# Patient Record
(demographics unavailable — no encounter records)

---

## 2017-02-04 NOTE — ED
Ander GAMBLE Matthew, scribed for Cesar Boss MD on 02/04/17 at 1123 .





HPI Chest Pain





- HPI Summary


HPI Summary: 


An 83 y/o male presents to the ED with constant, mid sternal chest pain since 07

:30 this morning. The patient was eating breakfast when he noticed left arm 

numbness. Associated symptoms include diaphoresis - which has since resolved 

and left arm pain, which has since resolved. The patient denies nausea, vomiting

, and SOB. Currently he states that chest pain has partially alleviated, but 

remains noticeable. The patient has a Hx of HTN, HLD, and NO diabetes. FHx: No 

CAD. 324 ASA in the EMS PTA. 








- History of Current Complaint


Chief Complaint: EDChestPainROMI


Hx Obtained From: Patient


Onset/Duration: Started Hours Ago, Atraumatic, Still Present


Time of Onset: 07:30


Timing: Constant


Initial Severity: Moderate


Current Severity: Moderate


Chest Pain Location: Mid Sternal


Chest Pain Radiates: Yes


Chest Pain Radiates To:: Arm - LT


Associated Signs and Symptoms: Positive: Chest Pain, Numbness - LT arm - since 

resolved, Diaphoresis, Other: - Left arm pain - since resolved.  Negative: 

Shortness of Breath, Nausea, Vomiting





- Allergy/Home Medications


Allergies/Adverse Reactions: 


 Allergies











Allergy/AdvReac Type Severity Reaction Status Date / Time


 


No Known Allergies Allergy   Verified 10/14/16 09:10














PMH/Surg Hx/FS Hx/Imm Hx


Endocrine/Hematology History: 


   Denies: Hx Diabetes


Cardiovascular History: Reports: Hx Hypercholesterolemia, Hx Hypertension


   Denies: Hx Pacemaker/ICD


 History: 


   Denies: Hx Renal Disease


Musculoskeletal History: Reports: Hx Arthritis, Hx Back Problems - spinal 

stenosis


Sensory History: Reports: Hx Cataracts


   Denies: Hx Hearing Aid


Opthamlomology History: Reports: Hx Cataracts


Psychiatric History: 


   Denies: Hx Panic Disorder





- Surgical History


Surgery Procedure, Year, and Place: HERNIA SURGERY 1965-CATARACTS BILATERAL 

EYES 2011


Infectious Disease History: No


Infectious Disease History: 


   Denies: Traveled Outside the [place] in Last 30 Days





- Family History


Known Family History: 


   Negative: Cardiac Disease





- Social History


Alcohol Use: Rare


Substance Use Type: Reports: None


Smoking Status (MU): Never Smoked Tobacco





Review of Systems


Positive: Skin Diaphoresis - For a few minutes, which has since resolved 


Eyes: Negative


ENT: Negative


Positive: Chest Pain


Respiratory: Negative


Negative: Shortness Of Breath


Gastrointestinal: Negative


Negative: Vomiting, Nausea


Genitourinary: Negative


Positive: Myalgia - Left Arm Pain - since resolved 


Skin: Negative


Positive: Numbness - Left arm numbness - since resolved 


Psychological: Normal


All Other Systems Reviewed And Are Negative: Yes





Physical Exam


Triage Information Reviewed: Yes


Vital Signs On Initial Exam: 


 Initial Vitals











Temp Pulse Resp BP Pulse Ox


 


 98.2 F   77   18   212/78   98 


 


 02/04/17 10:56  02/04/17 10:56  02/04/17 10:56  02/04/17 10:56  02/04/17 10:56











Vital Signs Reviewed: Yes


Appearance: Positive: No Pain Distress, Well-Nourished


Skin: Positive: Warm, Skin Color Reflects Adequate Perfusion


Head/Face: Positive: Normal Head/Face Inspection


Eyes: Positive: Normal, EOMI


Neck: Positive: Supple, Nontender


Respiratory/Lung Sounds: Positive: Clear to Auscultation, Breath Sounds Present


Cardiovascular: Positive: Normal, RRR.  Negative: Murmur


Abdomen Description: Positive: Nontender


Musculoskeletal: Positive: Normal, Strength/ROM Intact


Neurological: Positive: Normal, Sensory/Motor Intact, Alert, Oriented to Person 

Place, Time, CN Intact II-III


Psychiatric: Positive: Normal





- Shea Coma Scale


Best Eye Response: 4 - Spontaneous


Best Motor Response: 6 - Obeys Commands


Best Verbal Response: 5 - Oriented





Diagnostics





- Vital Signs


 Vital Signs











  Temp Pulse Resp BP Pulse Ox


 


 02/04/17 10:56  98.2 F  77  18  212/78  98














- Laboratory


Lab Results: 


 Lab Results











  02/04/17 Range/Units





  11:05 


 


WBC  11.6 H  (3.5-10.8)  10^3/ul


 


RBC  3.93 L  (4.0-5.4)  10^6/ul


 


Hgb  13.2 L  (14.0-18.0)  g/dl


 


Hct  40 L  (42-52)  %


 


MCV  101 H  (80-94)  fL


 


MCH  34 H  (27-31)  pg


 


MCHC  33  (31-36)  g/dl


 


RDW  13  (10.5-15)  %


 


Plt Count  220  (150-450)  10^3/ul


 


MPV  10  (7.4-10.4)  um3


 


Neut % (Auto)  76.9  (38-83)  %


 


Lymph % (Auto)  13.4 L  (25-47)  %


 


Mono % (Auto)  8.6  (1-9)  %


 


Eos % (Auto)  0.8  (0-6)  %


 


Baso % (Auto)  0.3  (0-2)  %


 


Absolute Neuts (auto)  8.9 H  (1.5-7.7)  10^3/ul


 


Absolute Lymphs (auto)  1.5  (1.0-4.8)  10^3/ul


 


Absolute Monos (auto)  1.0 H  (0-0.8)  10^3/ul


 


Absolute Eos (auto)  0.1  (0-0.6)  10^3/ul


 


Absolute Basos (auto)  0  (0-0.2)  10^3/ul


 


Absolute Nucleated RBC  0  10^3/ul


 


Nucleated RBC %  0  











Result Diagrams: 


 02/04/17 11:05





 02/04/17 11:05


Lab Statement: Any lab studies that have been ordered have been reviewed, and 

results considered in the medical decision making process.





- EKG


  ** 10:51


Cardiac Rate: NL - 76 bpm


EKG Rhythm: Sinus Rhythm


EKG Interpretation: Inferior Infarct 





Chest Pain Course/Dx





- Course


Course Of Treatment: 84 yr old male with MI, to cath lab with Dr Corcoran who has 

seen the patient.





- Diagnoses


Provider Diagnoses: 


 Acute MI





During the Visit The Following Alert/Code Occurred: STEMI





- Provider Notifications


Discussed Care Of Patient With: Dr. Corcoran (Interventionalist) -- Notified of 

patient's history and will evaluate the patient.





- Critical Care Time


Critical Care Time: 30-74 min





Discharge





- Discharge Plan


Condition: Good


Disposition: ADMITTED TO [facility] documentation as recorded by the Ander aleman Matthew accurately 

reflects the service I personally performed and the decisions made by Karyn brown Walter, MD.

## 2017-02-04 NOTE — HP
ADMISSION HISTORY AND PHYSICAL AND TRANSFER NOTE



DATE OF ADMISSION: 02/04/2017 - EMERGENCY DEPT

 

CHIEF COMPLAINT:  Persistent chest discomfort.  EKG suggesting acute ST-
segmental elevation inferior wall myocardial infarction.

 

HISTORY OF PRESENT ILLNESS:  The patient is a pleasant 84-year-old gentleman 
with no prior known cardiac history.  Specifically he denies any history of 
myocardial infarction, congestive heart failure or significant heart rhythm 
disturbance.  The patient was in his usual state of health, but awoke and had 
breakfast this morning, noted the onset of significant left arm discomfort that 
then went across his whole chest and into the right arm.  The left arm 
discomfort seemed to lighten up, but the chest discomfort persisted.  He 
eventually called the medical service and they brought him to the hospital.  
The EKG en route had subtle ST-segment elevation changes in the inferior leads,
and a right bundle branch block was noted.  The STEMI Alert was not called at 
that time.  The patient had a repeat EKG done at 10:51, on which they felt 
there were now reciprocal changes in aVL with persistent ST-segment elevation 
in II, III, aVF and a STEMI Alert was called.

 

The patient's cardiac risk factors include a history of hypertension and 
hyperlipidemia.  He does not smoke.  He has no family history of early heart 
disease and he denies any diabetes.  His current medications are unknown at 
this time.

 

PAST MEDICAL HISTORY:  Hypertension, hyperlipidemia, low back discomfort.

 

PAST SURGICAL HISTORY:  Includes cataract surgeries from the eyes, hernia 
surgery.

 

REVIEW OF SYSTEMS:  Pertinent to proceeding to the cardiovascular lab, he 
denies any stroke, TIA, he denies any known history of significant renal 
insufficiency.  He denies any hematochezia, hematemesis or hematuria.

 

ALLERGIES:  He denies any significant allergies.

 

                               PHYSICAL EXAMINATION

 

When I see him reveals a pleasant gentleman.

 

VITAL SIGNS:  Blood pressure 191/85, pulse 77 and regular, respirations 18, O2 
saturation 98% on room air.  Afebrile.

 

NECK:  Supple.  There is no obvious increased JVP.  Carotid with fair upstroke 
and volume.  I cannot appreciate a definite bruit or transmitted murmur.

 

HEENT:  Conjunctivae are pink.  Sclerae clear.  Mouth reveals moist mucosa.

 

LUNGS:  Reveals no accessory muscle usage.  There is fair excursion.  No active 
rales, rhonchi, or wheezes.

 

HEART:  Reveals no visible heaves.  No palpable heaves or thrills.  Normal S1, 
S2, question soft systolic murmur.

 

ABDOMEN:  Soft, nontender without organomegaly.

 

EXTREMITIES:  Without clubbing, cyanosis or flores pitting edema.

 

NEUROLOGIC:  The patient is alert, oriented with normal mentation.

 

MUSCULOSKELETAL:  The patient moves all extremities appropriately.

 

PSYCHIATRIC:  The patient with appropriate affect.

 

 LABORATORY DATA:  Laboratory results reveal white count 11,600,  hemoglobin 
and hematocrit 13.2 and 40, platelet count of 220,000.  Sodium 130, potassium 
4.0, chloride 101, bicarb 21, BUN and creatinine 31 and 1.5.  Glucose 155.  
Magnesium 1.7, SGOT 34, SGPT 34, alk phos 127.  Troponin pending, total CPK 
159.  Albumin 4.1.  LDL 69.

 

EKG from the emergency room dated 02/04/2017, timed 10:51, revealed sinus rhythm
, heart rate 76, [place] interval 0.19, QRS 0.13, QT 0.40, and axis was -162 degrees
, right bundle branch block with left posterior eli-block noted, ST-segment 
elevation is noted in II, III, aVF with mild reciprocal changes in aVL and V2, 
suggesting acute inferior MI.



OVERALL ASSESSMENT: The patient presents with an EKG suggesting acute STEMI 
involving the inferior wall . At this time, he is feeling somewhat better with 
less discomfort. The patient has received heparin 4000 units, we added 180 mg 
of Brilinta and started IV nitroglycerin for his hypertension.  He had received 
aspirin full dose by the EMS in transit.  The risks and benefits of cardiac 
catheterization were explained to them.  They understand them and wished to 
proceed.  Further management will be made pending on the results of the cardiac 
catheterization.



ADDENDUM: The patient underwent emergent catheterization revealing severe 
triple vessel disease. JENNY III flow was noted and he was painfree.  A 
discussion ensued with the patient and his wife as to the need to transfer the 
patient for open heart surgery. Dr. Wellington, a cardiothoracic surgeon at 
[facility] was contacted and the case was discussed. He 
graciously accepted the patient for transfer for coronary artery bypass 
surgery. The patient was on IV Nitroglycerin and IV heparin and IV fluids at 
the time of the transfer and was pain free. Ongoing medical management will be 
under the guidance of Dr Wellington at [facility].



CC:  Cholo Boo MD; Dr. Wellington, [facility] *

 

 [id number]/[facility] #: [id number]

[facility] Hospital[facility]AN

## 2017-02-05 NOTE — CATH
CARDIAC CATHETERIZATION REPORT:

 

DATE OF PROCEDURE:  02/04/17 - EMERGENCY DEPT.

 

INDICATION FOR PROCEDURE:  The patient with ST segment elevation inferior wall 
myocardial infarction.

 

PROCEDURE:  Coronary arteriography, left heart catheterization, left 
ventriculography.

 

DESCRIPTION OF PROCEDURE:  The patient was interviewed and examined in the 
emergency room where the risks and benefits were explained.  He understood and 
wished to proceed.  He was brought to the cardiovascular laboratory where a 
formal time-out was performed.  The patient was prepped and draped in sterile 
fashion. Right coronary artery was anesthetized with 1% lidocaine.  Right 
femoral artery was cannulated and a 6-Citizen of [place] introducer was placed.  Coronary 
arteriography was performed a 5-Citizen [place] 4-Willie left coronary catheter and a 5-
Citizen [place] 4-Willie right coronary catheter.  Central aortic pressure was recorded 
using an angle pigtail catheter advanced to the ascending aorta where central 
aortic pressure was recorded.  The catheter was then passed across the aortic 
valve into the left ventricle where left ventricular pressure was recorded.  
Left ventriculography was performed utilizing a total of 24 cc of Omnipaque dye 
at a rate of 12 cc per second.  The catheter was then pulled across the aortic 
valve to recheck gradient. Following that, the films were reviewed and the 
decision was made to transfer the patient to [facility] for 
triple vessel bypass surgery.  The patient was placed on a heparin drip at 1200 
units an hour.  The patient was already on IV nitroglycerin that was titrated 
up for blood pressure and the patient received 2.5 mg of metoprolol 
intravenously.

 

The total contrast used was 90 cc of Visipaque dye.  The radiation exposure 
included 5.7 minutes of fluoro time.  The air kerma radiation was 609 
milligray. The DAP radiation was 3928 microgray per meter square.

 

RESULTS:

 

HEMODYNAMIC DATA:  

   Left heart catheterization - central aortic pressure was recorded at 191/73 
with a mean of 121.  Left ventricular pressure 188 over the left ventricular end
-diastolic pressure of 35 to 40.

 

LEFT VENTRICULOGRAPHY:

    Performed in the DURAN projection revealed apical hypokinesis with 
preservation of the inferior and the anterior wall with hypokinesis of the 
distal anterior wall suggested as well overall ejection fraction approximately 
40%.

 

CORONARY ARTERIOGRAPHY: 

   A.  Left coronary artery:

      1.  Left main - the left main had minimal 10% distal narrowing seen.

      2.  Left anterior descending - the left anterior descending artery had 
diffuse disease throughout the course of it.  In the proximal segment, there 
was abrupt caliber change of approximately 70%.  This was just before the first 
small caliber diagonal branch.  Following this, there was moderate diffuse 
disease with areas of 45% to 50% narrowing.  The second diagonal branch showed 
no significant obstruction and it bifurcated supplying the anterior lateral 
wall.  Following this, there was a narrowing that appeared to be as much as 85% 
to 90% narrowing.  The rest of the left anterior descending artery had no 
significant obstructions noted.

      3. Circumflex - nondominant vessel supplying a thin 1st obtuse marginal 
branch with a critical 95 % obstruction seen proximally. following this was a 
moderate sized bifurcating 2nd obtuse marginal branch traversing across the mid 
to low inferior wall to lateral apical region.This vessel did not appears to 
have any significant lesions.

   B.  Right coronary artery:

       - a dominant vessel supplying the PDA and multiple posterior left 
ventricular branches.  The right coronary artery had an anterior takeoff to it 
as well.  There was a critical 95% proximal narrowing seen in the right 
coronary artery.  The posterior descending artery had a 40% proximal lesion and 
a 65% to 70% mid lesion noted.  The artery continued on to supply the posterior 
left ventricular branches.

 

OVERALL ASSESSMENT:  

   Severe triple vessel disease involving proximal right coronary artery, mid 
to distal posterior descending artery, proximal and mid left anterior 
descending artery and proximal circumflex artery.  JENNY-3 flow was noted 
throughout and the patient was pain free during the procedure.  At this point 
in time, intravenous heparin was administered and decision was made to transfer 
the patient for bypass surgery to [facility].  I discussed all 
of this with the patient and the patient's wife, they understood it and agreed 
to proceed.  I spoke personally with Dr. Wellington the open heart surgeon who 
graciously accepted the patient at [facility].  I did explain 
to him that the patient had received 180 mg of Brilinta in the emergency room 
and he understood that.  They will adjust timing of the surgery accordingly, as 
long as the patient remains stable.

 

CC:  Cholo Boo MD *



 [id number]/[facility] #: [id number]

MARIO